# Patient Record
Sex: MALE | Race: WHITE | NOT HISPANIC OR LATINO | URBAN - METROPOLITAN AREA
[De-identification: names, ages, dates, MRNs, and addresses within clinical notes are randomized per-mention and may not be internally consistent; named-entity substitution may affect disease eponyms.]

---

## 2021-09-21 PROCEDURE — U0004 COV-19 TEST NON-CDC HGH THRU: HCPCS | Performed by: FAMILY MEDICINE

## 2022-06-23 ENCOUNTER — OFFICE VISIT (OUTPATIENT)
Dept: ENDOCRINOLOGY | Facility: CLINIC | Age: 67
End: 2022-06-23

## 2022-06-23 VITALS
WEIGHT: 223 LBS | HEART RATE: 80 BPM | OXYGEN SATURATION: 96 % | DIASTOLIC BLOOD PRESSURE: 92 MMHG | BODY MASS INDEX: 35.84 KG/M2 | HEIGHT: 66 IN | SYSTOLIC BLOOD PRESSURE: 154 MMHG

## 2022-06-23 DIAGNOSIS — E11.65 TYPE 2 DIABETES MELLITUS WITH HYPERGLYCEMIA, UNSPECIFIED WHETHER LONG TERM INSULIN USE: Primary | ICD-10-CM

## 2022-06-23 DIAGNOSIS — E78.5 HYPERLIPIDEMIA, UNSPECIFIED HYPERLIPIDEMIA TYPE: ICD-10-CM

## 2022-06-23 LAB
EXPIRATION DATE: ABNORMAL
GLUCOSE BLDC GLUCOMTR-MCNC: 219 MG/DL (ref 70–130)
Lab: ABNORMAL

## 2022-06-23 PROCEDURE — 99204 OFFICE O/P NEW MOD 45 MIN: CPT | Performed by: INTERNAL MEDICINE

## 2022-06-23 PROCEDURE — 82947 ASSAY GLUCOSE BLOOD QUANT: CPT | Performed by: INTERNAL MEDICINE

## 2022-06-23 RX ORDER — TAMSULOSIN HYDROCHLORIDE 0.4 MG/1
1 CAPSULE ORAL DAILY
COMMUNITY
Start: 2022-05-31

## 2022-06-23 RX ORDER — SITAGLIPTIN AND METFORMIN HYDROCHLORIDE 1000; 50 MG/1; MG/1
1 TABLET, FILM COATED ORAL 2 TIMES DAILY
COMMUNITY
Start: 2022-03-14 | End: 2022-07-11

## 2022-06-23 RX ORDER — ATORVASTATIN CALCIUM 20 MG/1
20 TABLET, FILM COATED ORAL DAILY
COMMUNITY
Start: 2022-06-15

## 2022-06-23 RX ORDER — VALSARTAN AND HYDROCHLOROTHIAZIDE 320; 25 MG/1; MG/1
1 TABLET, FILM COATED ORAL DAILY
COMMUNITY
Start: 2022-04-25

## 2022-06-23 RX ORDER — MIRABEGRON 25 MG/1
25 TABLET, FILM COATED, EXTENDED RELEASE ORAL DAILY
COMMUNITY
Start: 2022-05-26

## 2022-06-23 RX ORDER — PEN NEEDLE, DIABETIC 32GX 5/32"
1 NEEDLE, DISPOSABLE MISCELLANEOUS DAILY
COMMUNITY
Start: 2022-04-05

## 2022-06-23 RX ORDER — BLOOD SUGAR DIAGNOSTIC
1 STRIP MISCELLANEOUS 3 TIMES DAILY
COMMUNITY
Start: 2022-04-15

## 2022-06-23 RX ORDER — INSULIN GLARGINE 100 [IU]/ML
35 INJECTION, SOLUTION SUBCUTANEOUS DAILY
COMMUNITY
Start: 2022-06-11 | End: 2022-06-23

## 2022-06-23 RX ORDER — INSULIN GLARGINE 100 [IU]/ML
INJECTION, SOLUTION SUBCUTANEOUS
Qty: 30 ML | Refills: 3 | Status: SHIPPED | OUTPATIENT
Start: 2022-06-23

## 2022-06-23 RX ORDER — GLIPIZIDE 10 MG/1
10 TABLET, FILM COATED, EXTENDED RELEASE ORAL 2 TIMES DAILY
COMMUNITY

## 2022-06-23 RX ORDER — TROSPIUM CHLORIDE 20 MG/1
20 TABLET, FILM COATED ORAL 2 TIMES DAILY
COMMUNITY
Start: 2022-05-09 | End: 2022-11-29

## 2022-06-23 NOTE — PROGRESS NOTES
Chief Complaint   Patient presents with   • Diabetes        New patient who is being seen in consultation regarding diabetes at the request of Akua Joy PA HPI   Andrzej Cheng is a 66 y.o. male who presents for evaluation of diabetes.    Patient has a history of type 2 diabetes.   Patient is currently taking Glipizide extended release 10 mg twice daily, Janumet  1 tablet twice daily, Lantus 35 units once daily.  This regimen was last changed approximately 2 months ago when insulin was started.  Patient also reports that Jardiance was discontinued approximately 2 months ago, he is unsure why this medication was stopped and denies having had any adverse effects with it.  Patient believes that glycemic control is poor.  Patient reports most recent A1c was elevated. Patient reports good adherence to medications.      Patient denies hypoglycemia or symptoms concerning for hypoglycemia  Patient monitors blood glucose rarely, patient reports that he was checking blood sugars values were generally stable in the low 200s and given no medication changes he discontinue routine monitoring.  Patient attempts to follow a diabetic diet.  Patient does report diarrhea on a regular basis as well as some fecal urgency.  He is unsure if the timing of the symptoms correlates to when metformin was started.    Patient denies foot related concerns such as numbness, tingling, or pain.  History of dyslipidemia: Yes  History of renal dysfunction: No  History of Hypertension: Yes      Past Medical History:   Diagnosis Date   • Hyperlipidemia    • Hypertension    • Type 2 diabetes mellitus (HCC)      Past Surgical History:   Procedure Laterality Date   • COLONOSCOPY     • WISDOM TOOTH EXTRACTION        Family History   Problem Relation Age of Onset   • Hypertension Mother    • Diabetes Mother    • Hypertension Father    • Cancer Father    • Esophageal cancer Father    • Hypertension Sister    • Diabetes Sister    • Heart  disease Maternal Grandmother    • Cancer Paternal Grandfather    • Other Son         OVERDOSE   • Drug abuse Son    • Diabetes Son         prediabetes      Social History     Socioeconomic History   • Marital status:    Tobacco Use   • Smoking status: Never Smoker   • Smokeless tobacco: Never Used   Vaping Use   • Vaping Use: Never used   Substance and Sexual Activity   • Alcohol use: Yes     Comment: OCCASIONAL   • Drug use: Never   • Sexual activity: Defer      No Known Allergies   Current Outpatient Medications on File Prior to Visit   Medication Sig Dispense Refill   • atorvastatin (LIPITOR) 20 MG tablet Take 20 mg by mouth Daily.     • glipizide (GLUCOTROL XL) 10 MG 24 hr tablet Take 10 mg by mouth 2 (Two) Times a Day.     • Lantus SoloStar 100 UNIT/ML injection pen Inject 35 Units under the skin into the appropriate area as directed Daily.     • MM Pen Needles 32G X 4 MM misc 1 each by Other route Daily.     • Myrbetriq 25 MG tablet sustained-release 24 hour 24 hr tablet Take 25 mg by mouth Daily.     • OneTouch Ultra test strip 1 each by Other route 3 (Three) Times a Day.     • sitaGLIPtin-metFORMIN (Janumet)  MG per tablet Take 1 tablet by mouth 2 (Two) Times a Day.     • tamsulosin (FLOMAX) 0.4 MG capsule 24 hr capsule Take 1 capsule by mouth Daily.     • trospium (SANCTURA) 20 MG tablet Take 20 mg by mouth 2 (Two) Times a Day.     • valsartan-hydrochlorothiazide (DIOVAN-HCT) 320-25 MG per tablet Take 1 tablet by mouth Daily.       No current facility-administered medications on file prior to visit.        Review of Systems   Constitutional: Positive for activity change. Negative for unexpected weight gain and unexpected weight loss.   HENT: Negative for trouble swallowing and voice change.    Eyes: Negative for pain and visual disturbance.   Respiratory: Negative for cough and shortness of breath.    Cardiovascular: Negative for palpitations and leg swelling.   Gastrointestinal: Positive for  "diarrhea. Negative for constipation.   Endocrine: Negative for polydipsia and polyuria.   Genitourinary: Positive for urgency.   Musculoskeletal: Negative for back pain and neck pain.   Skin: Negative for dry skin and rash.   Neurological: Negative for tremors and headache.   Psychiatric/Behavioral: Negative for sleep disturbance. The patient is not nervous/anxious.         Vitals:    06/23/22 0846   BP: 154/92   BP Location: Right arm   Patient Position: Sitting   Cuff Size: Adult   Pulse: 80   SpO2: 96%   Weight: 101 kg (223 lb)   Height: 167.6 cm (66\")   Body mass index is 35.99 kg/m².     Physical Exam  Vitals reviewed.   Constitutional:       General: He is not in acute distress.     Appearance: Normal appearance. He is obese.   HENT:      Head: Normocephalic and atraumatic.      Right Ear: Hearing normal.      Left Ear: Hearing normal.      Nose: Nose normal.   Eyes:      General: Lids are normal.      Conjunctiva/sclera: Conjunctivae normal.   Neck:      Thyroid: No thyromegaly or thyroid tenderness.   Cardiovascular:      Rate and Rhythm: Normal rate and regular rhythm.      Heart sounds: Murmur heard.   Pulmonary:      Effort: Pulmonary effort is normal.      Breath sounds: Normal breath sounds and air entry.   Abdominal:      General: Bowel sounds are normal.      Palpations: Abdomen is soft.      Tenderness: There is no abdominal tenderness.   Lymphadenopathy:      Head:      Right side of head: No submandibular adenopathy.      Left side of head: No submandibular adenopathy.      Cervical: No cervical adenopathy.   Skin:     General: Skin is warm and dry.      Findings: No rash.   Neurological:      General: No focal deficit present.      Mental Status: He is alert.      Deep Tendon Reflexes: Reflexes are normal and symmetric.   Psychiatric:         Mood and Affect: Mood and affect normal.         Behavior: Behavior is cooperative.          Labs/Imaging    Labs dated 4/1/2022   eGFR 91  Alkaline " phosphatase 63  ALT 17  AST 16    Triglycerides 327  Hemoglobin A1c 10.9     Latest Reference Range & Units 06/23/22 09:08   Glucose 70 - 130 mg/dL 219 !   !: Data is abnormal    Assessment and Plan    Diagnoses and all orders for this visit:    1. Type 2 diabetes mellitus with hyperglycemia, unspecified whether long term insulin use (HCC) (Primary)  -Uncontrolled with most recent hemoglobin A1c 10.9%, too soon to repeat today  -Patient hyperglycemic during office visit, no home glucose data available for review  -Patient reports concerns for diarrhea, he is currently taking Janumet.  Patient given 2 weeks worth of samples of plain Januvia during office visit and will hold Janumet for the next 2 weeks.  Patient will contact the clinic with an update on whether her diarrhea resolved with holding metformin.  -Patient to continue glipizide extended release 20 mg daily  -Patient to increase Lantus to 40 units daily, he will then titrate by 2 units weekly until fasting sugar less than 150, maximum 60  -Patient was advised to monitor blood sugar 2 times daily.  Patient did inquire about CGM use, discussed that insurance coverage is variable.  Freestyle nico sent to pharmacy per patient request.  Patient was instructed to bring glucometer to all future appointments. Patient should contact the clinic between appointments with hypoglycemia or persistent hyperglycemia.  Discussed signs and symptoms of hypoglycemia as well as hypoglycemia management via the rule of 15's.  Discussed potential for long-term complications with uncontrolled diabetes including nephropathy, neuropathy, retinopathy, increased risk for cardiac disease.  Discussed the role of diet and exercise in the management of diabetes.  CMP up-to-date from April 2022, EGFR 91  Lipid panel up-to-date from April 2022,   Obtain urine microalbumin with next labs  -     POC Glucose, Blood    2. Hyperlipidemia, unspecified hyperlipidemia type  LDL  elevated on most recent labs, continue atorvastatin 20 mg daily    Other orders  -     Continuous Blood Gluc Sensor (FreeStyle Eb 2 Sensor) misc; 1 each Every 14 (Fourteen) Days.  Dispense: 3 each; Refill: 11  -     Continuous Blood Gluc  (FreeStyle Eb 2 [place]) device; 1 each 1 (One) Time for 1 dose.  Dispense: 1 each; Refill: 0         Return in about 4 months (around 10/23/2022) for Next scheduled follow up. The patient was instructed to contact the clinic with any interval questions or concerns.    Violeta Rice MD     Dictated Utilizing Dragon Dictation

## 2022-07-11 ENCOUNTER — TELEPHONE (OUTPATIENT)
Dept: ENDOCRINOLOGY | Facility: CLINIC | Age: 67
End: 2022-07-11

## 2022-07-11 NOTE — TELEPHONE ENCOUNTER
PATIENT CALLED TO REQUEST REFILL. PATIENT DOES NOT KNOW WHAT MEDICATION THIS WOULD BE. PATIENT STATES THAT HE WAS GIVEN SAMPLES OF MEDICATION AT LAST OFFICE VISIT AND IS NOW OUT OF THOSE SAMPLES BUT DOES NOT KNOW THE NAME OF SAID SAMPLES.    [facility]

## 2022-11-29 ENCOUNTER — LAB (OUTPATIENT)
Dept: LAB | Facility: HOSPITAL | Age: 67
End: 2022-11-29

## 2022-11-29 ENCOUNTER — OFFICE VISIT (OUTPATIENT)
Dept: ENDOCRINOLOGY | Facility: CLINIC | Age: 67
End: 2022-11-29

## 2022-11-29 VITALS
HEIGHT: 66 IN | SYSTOLIC BLOOD PRESSURE: 148 MMHG | WEIGHT: 221 LBS | HEART RATE: 74 BPM | BODY MASS INDEX: 35.52 KG/M2 | OXYGEN SATURATION: 95 % | DIASTOLIC BLOOD PRESSURE: 90 MMHG

## 2022-11-29 DIAGNOSIS — E11.65 TYPE 2 DIABETES MELLITUS WITH HYPERGLYCEMIA, UNSPECIFIED WHETHER LONG TERM INSULIN USE: Primary | ICD-10-CM

## 2022-11-29 DIAGNOSIS — E78.5 HYPERLIPIDEMIA, UNSPECIFIED HYPERLIPIDEMIA TYPE: ICD-10-CM

## 2022-11-29 LAB
ALBUMIN UR-MCNC: 3.8 MG/DL
CREAT UR-MCNC: 104.1 MG/DL
EXPIRATION DATE: ABNORMAL
GLUCOSE BLDC GLUCOMTR-MCNC: 312 MG/DL (ref 70–130)
HBA1C MFR BLD: 11 % (ref 4.8–5.6)
Lab: ABNORMAL
MICROALBUMIN/CREAT UR: 36.5 MG/G

## 2022-11-29 PROCEDURE — 83036 HEMOGLOBIN GLYCOSYLATED A1C: CPT | Performed by: INTERNAL MEDICINE

## 2022-11-29 PROCEDURE — 82947 ASSAY GLUCOSE BLOOD QUANT: CPT | Performed by: INTERNAL MEDICINE

## 2022-11-29 PROCEDURE — 82043 UR ALBUMIN QUANTITATIVE: CPT | Performed by: INTERNAL MEDICINE

## 2022-11-29 PROCEDURE — 99214 OFFICE O/P EST MOD 30 MIN: CPT | Performed by: INTERNAL MEDICINE

## 2022-11-29 PROCEDURE — 82570 ASSAY OF URINE CREATININE: CPT | Performed by: INTERNAL MEDICINE

## 2022-11-29 NOTE — PROGRESS NOTES
"Chief Complaint   Patient presents with   • Diabetes        HPI   Andrzej Cheng is a 67 y.o. male had concerns including Diabetes.      Patient presents for diabetes follow up. Metformin stopped last visit, reports no current issues with GI upset.  He did increase dose of Lantus to 46 units daily and has been taking this for the past couple of weeks.  He reports fasting sugars generally around 250.  Only single data point noted on glucometer.    Current diabetes medications include the following  Januvia 100 mg daily  Lantus 46 units daily  Glipizide 20 mg extended release daily    Patient continues on atorvastatin 20 mg daily for hyperlipidemia.  He denies myalgias.    The following portions of the patient's history were reviewed and updated as appropriate: allergies, current medications and past social history.    Review of Systems   Gastrointestinal: Negative for diarrhea, nausea and vomiting.   Endocrine: Negative for polydipsia and polyuria.   Musculoskeletal: Negative for myalgias.      /90 (BP Location: Right arm, Patient Position: Sitting, Cuff Size: Adult)   Pulse 74   Ht 167.6 cm (66\")   Wt 100 kg (221 lb)   SpO2 95%   BMI 35.67 kg/m²      Physical Exam      Constitutional:  well developed; well nourished  no acute distress  appears stated age   ENT/Thyroid: not examined   Eyes: Conjunctiva: clear   Respiratory:  breathing is unlabored  clear to auscultation bilaterally   Cardiovascular:  regular rate and rhythm   Chest:  Not performed.   Abdomen: Not performed.   : Not performed.   Musculoskeletal: Not performed   Skin: not performed.   Neuro: mental status, speech normal   Psych: mood and affect are within normal limits       Labs/Imaging   Latest Reference Range & Units 11/29/22 08:40   Glucose 70 - 130 mg/dL 312 !   !: Data is abnormal    Diagnoses and all orders for this visit:    1. Type 2 diabetes mellitus with hyperglycemia, unspecified whether long term insulin use (HCC) " (Primary)  Diabetes uncontrolled with hyperglycemia, check A1c with labs  Patient to continue glipizide extended release 20 mg daily  Plan to start Jardiance 10 mg daily, reviewed potential adverse effects  Plan to increase Lantus to 50 units daily  Continue Januvia 100 mg daily  Patient was advised to monitor blood sugar 2 times daily.  Patient was instructed to bring glucometer to all future appointments. Patient should contact the clinic between appointments with hypoglycemia or persistent hyperglycemia.  Discussed signs and symptoms of hypoglycemia as well as hypoglycemia management via the rule of 15's.  Discussed potential for long-term complications with uncontrolled diabetes including nephropathy, neuropathy, retinopathy, increased risk for cardiac disease.  Discussed the role of diet and exercise in the management of diabetes.  Update urine microalbumin today  -     POC Glucose, Blood  -     Microalbumin / Creatinine Urine Ratio - Urine, Clean Catch  -     Hemoglobin A1c    2. Hyperlipidemia, unspecified hyperlipidemia type  Lipid panel is up-to-date, continue atorvastatin     Addendum dated 1/12/23  Received results of eye exam dated 12/2022, see scanned report  Return in about 3 months (around 2/28/2023) for Next scheduled follow up. The patient was instructed to contact the clinic with any interval questions or concerns.    Violeta Rice MD   Endocrinologist    Dictated Utilizing Dragon Dictation

## 2022-11-30 ENCOUNTER — TELEPHONE (OUTPATIENT)
Dept: ENDOCRINOLOGY | Facility: CLINIC | Age: 67
End: 2022-11-30

## 2023-01-24 RX ORDER — SITAGLIPTIN 100 MG/1
TABLET, FILM COATED ORAL
Qty: 90 TABLET | Refills: 0 | Status: SHIPPED | OUTPATIENT
Start: 2023-01-24

## 2023-02-06 ENCOUNTER — TELEPHONE (OUTPATIENT)
Dept: ENDOCRINOLOGY | Facility: CLINIC | Age: 68
End: 2023-02-06
Payer: MEDICARE

## 2023-02-06 NOTE — TELEPHONE ENCOUNTER
"Called pt and told him per the OV on 11-29-22 \"Plan to start Jardiance 10 mg daily, Continue Januvia 100 mg daily\"    He verbalized understanding.  "

## 2023-02-06 NOTE — TELEPHONE ENCOUNTER
Please call pt he needs to clarify his medications he thinks the pharmacy refilled something wrong    Its regarding jardiance and januvia    pts number  [phone]

## 2023-03-17 RX ORDER — EMPAGLIFLOZIN 10 MG/1
TABLET, FILM COATED ORAL
Qty: 30 TABLET | Refills: 1 | Status: SHIPPED | OUTPATIENT
Start: 2023-03-17

## 2023-04-18 ENCOUNTER — OFFICE VISIT (OUTPATIENT)
Dept: ENDOCRINOLOGY | Facility: CLINIC | Age: 68
End: 2023-04-18
Payer: MEDICARE

## 2023-04-18 VITALS
HEART RATE: 74 BPM | OXYGEN SATURATION: 95 % | BODY MASS INDEX: 37.45 KG/M2 | HEIGHT: 66 IN | DIASTOLIC BLOOD PRESSURE: 78 MMHG | SYSTOLIC BLOOD PRESSURE: 142 MMHG | WEIGHT: 233 LBS

## 2023-04-18 DIAGNOSIS — E11.65 TYPE 2 DIABETES MELLITUS WITH HYPERGLYCEMIA, UNSPECIFIED WHETHER LONG TERM INSULIN USE: Primary | ICD-10-CM

## 2023-04-18 DIAGNOSIS — E78.5 HYPERLIPIDEMIA, UNSPECIFIED HYPERLIPIDEMIA TYPE: ICD-10-CM

## 2023-04-18 LAB
EXPIRATION DATE: ABNORMAL
EXPIRATION DATE: NORMAL
GLUCOSE BLDC GLUCOMTR-MCNC: 302 MG/DL (ref 70–130)
HBA1C MFR BLD: 10.6 %
Lab: ABNORMAL
Lab: NORMAL

## 2023-04-18 PROCEDURE — 1160F RVW MEDS BY RX/DR IN RCRD: CPT | Performed by: INTERNAL MEDICINE

## 2023-04-18 PROCEDURE — 3046F HEMOGLOBIN A1C LEVEL >9.0%: CPT | Performed by: INTERNAL MEDICINE

## 2023-04-18 PROCEDURE — 82947 ASSAY GLUCOSE BLOOD QUANT: CPT | Performed by: INTERNAL MEDICINE

## 2023-04-18 PROCEDURE — 83036 HEMOGLOBIN GLYCOSYLATED A1C: CPT | Performed by: INTERNAL MEDICINE

## 2023-04-18 PROCEDURE — 99214 OFFICE O/P EST MOD 30 MIN: CPT | Performed by: INTERNAL MEDICINE

## 2023-04-18 PROCEDURE — 1159F MED LIST DOCD IN RCRD: CPT | Performed by: INTERNAL MEDICINE

## 2023-04-18 RX ORDER — INSULIN GLARGINE 100 [IU]/ML
INJECTION, SOLUTION SUBCUTANEOUS
Qty: 30 ML | Refills: 3 | Status: SHIPPED | OUTPATIENT
Start: 2023-04-18

## 2023-04-18 NOTE — PROGRESS NOTES
"Chief Complaint   Patient presents with   • Diabetes        HPI   Andrzej Cheng is a 67 y.o. male had concerns including Diabetes.     Patient reports no significant health changes in the interim since last visit.  He reports that diet remains less than ideal and states he is not exercising regularly.  He denies any adverse effects since starting Jardiance.    Current diabetes medications include the following:  Lantus 50 units daily  Jardiance 10 mg daily  Glipizide extended release 20 mg daily  Januvia 100 mg daily    Patient did not tolerate metformin secondary to diarrhea.    Data from glucometer reviewed:  7-day average 249  30-day average 231    Patient continues on atorvastatin 20 mg daily    The following portions of the patient's history were reviewed and updated as appropriate: allergies, current medications and past social history.    Review of Systems   Gastrointestinal: Negative for nausea and vomiting.   Endocrine: Negative for polydipsia and polyuria.   Musculoskeletal: Negative for myalgias.        /78 (BP Location: Right arm, Patient Position: Sitting, Cuff Size: Adult)   Pulse 74   Ht 167.6 cm (66\")   Wt 106 kg (233 lb)   SpO2 95%   BMI 37.61 kg/m²      Physical Exam      Constitutional:  well developed; well nourished  no acute distress   ENT/Thyroid: not examined   Eyes: Conjunctiva: clear   Respiratory:  breathing is unlabored  clear to auscultation bilaterally   Cardiovascular:  regular rate and rhythm   Chest:  Not performed.   Abdomen: Not performed.   : Not performed.   Musculoskeletal: Not performed   Skin: not performed.   Neuro: mental status, speech normal   Psych: mood and affect are within normal limits       Labs/Imaging   Latest Reference Range & Units 04/18/23 08:32   Glucose 70 - 130 mg/dL 302 !   Hemoglobin A1C % 10.6   !: Data is abnormal    Diagnoses and all orders for this visit:    1. Type 2 diabetes mellitus with hyperglycemia, unspecified whether long " term insulin use (Primary)  Diabetes is poorly controlled with hemoglobin A1c 10.6%, minimally improved from prior  Increase Jardiance to 25 mg daily  Increase Lantus to 55 units daily  Continue Januvia 100 mg daily  Continue glipizide extended release 20 mg daily  Discussed potential need to consider transition of DPP 4 to GLP-1 receptor agonist next visit if glycemic control not significantly improved.  Patient was advised to monitor blood sugar 2 times daily.  Patient was instructed to bring glucometer to all future appointments. Patient should contact the clinic between appointments with hypoglycemia or persistent hyperglycemia.  Discussed signs and symptoms of hypoglycemia as well as hypoglycemia management via the rule of 15's.  Discussed potential for long-term complications with uncontrolled diabetes including nephropathy, neuropathy, retinopathy, increased risk for cardiac disease.  Discussed the role of diet and exercise in the management of diabetes.  Eye exam is up-to-date from December 2022  Urine microalbumin up-to-date from November 2022  Lipids, CMP up-to-date from April 2022  -     POC Glucose, Blood  -     POC Glycosylated Hemoglobin (Hb A1C)    2. Hyperlipidemia, unspecified hyperlipidemia type  Lipid panel is up-to-date, continue atorvastatin    Other orders  -     empagliflozin (Jardiance) 25 MG tablet tablet; Take 1 tablet by mouth Daily.  Dispense: 30 tablet; Refill: 5  -     Lantus SoloStar 100 UNIT/ML injection pen; 55 units daily  Dispense: 30 mL; Refill: 3    Return in about 3 months (around 7/18/2023) for Next scheduled follow up. The patient was instructed to contact the clinic with any interval questions or concerns.    Violeta Rice MD   Endocrinologist    Dictated Utilizing Dragon Dictation

## 2023-04-24 RX ORDER — SITAGLIPTIN 100 MG/1
TABLET, FILM COATED ORAL
Qty: 90 TABLET | Refills: 1 | Status: SHIPPED | OUTPATIENT
Start: 2023-04-24

## 2023-04-24 NOTE — TELEPHONE ENCOUNTER
Last office visit with prescribing clinician: 4/18/2023       Next office visit with prescribing clinician: 8/8/2023     {

## 2023-05-26 RX ORDER — EMPAGLIFLOZIN 10 MG/1
TABLET, FILM COATED ORAL
Qty: 30 TABLET | Refills: 0 | OUTPATIENT
Start: 2023-05-26

## 2023-06-02 RX ORDER — EMPAGLIFLOZIN 10 MG/1
TABLET, FILM COATED ORAL
Qty: 30 TABLET | Refills: 0 | OUTPATIENT
Start: 2023-06-02

## 2023-06-02 NOTE — TELEPHONE ENCOUNTER
The original prescription was discontinued on 4/18/2023 by Violeta Rice MD for the following reason: *Therapy completed. Renewing this prescription may not be appropriate.    Changed to 25 mg by YUE

## 2023-08-08 ENCOUNTER — OFFICE VISIT (OUTPATIENT)
Dept: ENDOCRINOLOGY | Facility: CLINIC | Age: 68
End: 2023-08-08
Payer: MEDICARE

## 2023-08-08 VITALS
WEIGHT: 233 LBS | BODY MASS INDEX: 37.45 KG/M2 | HEIGHT: 66 IN | DIASTOLIC BLOOD PRESSURE: 78 MMHG | HEART RATE: 76 BPM | OXYGEN SATURATION: 96 % | SYSTOLIC BLOOD PRESSURE: 124 MMHG

## 2023-08-08 DIAGNOSIS — E11.65 TYPE 2 DIABETES MELLITUS WITH HYPERGLYCEMIA, UNSPECIFIED WHETHER LONG TERM INSULIN USE: Primary | ICD-10-CM

## 2023-08-08 DIAGNOSIS — E78.5 HYPERLIPIDEMIA, UNSPECIFIED HYPERLIPIDEMIA TYPE: ICD-10-CM

## 2023-08-08 LAB
ALBUMIN SERPL-MCNC: 4.7 G/DL (ref 3.5–5.2)
ALBUMIN UR-MCNC: 3.8 MG/DL
ALBUMIN/GLOB SERPL: 1.8 G/DL
ALP SERPL-CCNC: 67 U/L (ref 39–117)
ALT SERPL W P-5'-P-CCNC: 33 U/L (ref 1–41)
ANION GAP SERPL CALCULATED.3IONS-SCNC: 17.2 MMOL/L (ref 5–15)
AST SERPL-CCNC: 24 U/L (ref 1–40)
BILIRUB SERPL-MCNC: 0.5 MG/DL (ref 0–1.2)
BUN SERPL-MCNC: 19 MG/DL (ref 8–23)
BUN/CREAT SERPL: 22.1 (ref 7–25)
CALCIUM SPEC-SCNC: 9.8 MG/DL (ref 8.6–10.5)
CHLORIDE SERPL-SCNC: 100 MMOL/L (ref 98–107)
CHOLEST SERPL-MCNC: 235 MG/DL (ref 0–200)
CO2 SERPL-SCNC: 23.8 MMOL/L (ref 22–29)
CREAT SERPL-MCNC: 0.86 MG/DL (ref 0.76–1.27)
CREAT UR-MCNC: 38.6 MG/DL
EGFRCR SERPLBLD CKD-EPI 2021: 93.7 ML/MIN/1.73
EXPIRATION DATE: ABNORMAL
EXPIRATION DATE: NORMAL
GLOBULIN UR ELPH-MCNC: 2.6 GM/DL
GLUCOSE BLDC GLUCOMTR-MCNC: 211 MG/DL (ref 70–130)
GLUCOSE SERPL-MCNC: 230 MG/DL (ref 65–99)
HBA1C MFR BLD: 9.8 %
HDLC SERPL-MCNC: 43 MG/DL (ref 40–60)
LDLC SERPL CALC-MCNC: 122 MG/DL (ref 0–100)
LDLC/HDLC SERPL: 2.61 {RATIO}
Lab: ABNORMAL
Lab: NORMAL
MICROALBUMIN/CREAT UR: 98.4 MG/G
POTASSIUM SERPL-SCNC: 3.9 MMOL/L (ref 3.5–5.2)
PROT SERPL-MCNC: 7.3 G/DL (ref 6–8.5)
SODIUM SERPL-SCNC: 141 MMOL/L (ref 136–145)
TRIGL SERPL-MCNC: 398 MG/DL (ref 0–150)
VLDLC SERPL-MCNC: 70 MG/DL (ref 5–40)

## 2023-08-08 PROCEDURE — 82570 ASSAY OF URINE CREATININE: CPT | Performed by: INTERNAL MEDICINE

## 2023-08-08 PROCEDURE — 99214 OFFICE O/P EST MOD 30 MIN: CPT | Performed by: INTERNAL MEDICINE

## 2023-08-08 PROCEDURE — 82043 UR ALBUMIN QUANTITATIVE: CPT | Performed by: INTERNAL MEDICINE

## 2023-08-08 PROCEDURE — 80061 LIPID PANEL: CPT | Performed by: INTERNAL MEDICINE

## 2023-08-08 PROCEDURE — 83036 HEMOGLOBIN GLYCOSYLATED A1C: CPT | Performed by: INTERNAL MEDICINE

## 2023-08-08 PROCEDURE — 80053 COMPREHEN METABOLIC PANEL: CPT | Performed by: INTERNAL MEDICINE

## 2023-08-08 PROCEDURE — 82947 ASSAY GLUCOSE BLOOD QUANT: CPT | Performed by: INTERNAL MEDICINE

## 2023-08-08 PROCEDURE — 3046F HEMOGLOBIN A1C LEVEL >9.0%: CPT | Performed by: INTERNAL MEDICINE

## 2023-08-08 RX ORDER — TIRZEPATIDE 2.5 MG/.5ML
2.5 INJECTION, SOLUTION SUBCUTANEOUS WEEKLY
Qty: 2 ML | Refills: 3 | Status: SHIPPED | OUTPATIENT
Start: 2023-08-08

## 2023-08-08 RX ORDER — BLOOD SUGAR DIAGNOSTIC
1 STRIP MISCELLANEOUS 3 TIMES DAILY
Qty: 100 EACH | Refills: 3 | Status: SHIPPED | OUTPATIENT
Start: 2023-08-08

## 2023-08-08 RX ORDER — ATORVASTATIN CALCIUM 40 MG/1
40 TABLET, FILM COATED ORAL DAILY
Qty: 90 TABLET | Refills: 1 | Status: SHIPPED | OUTPATIENT
Start: 2023-08-08 | End: 2024-08-07

## 2023-08-08 NOTE — PROGRESS NOTES
"Chief Complaint   Patient presents with    Diabetes        HPI   Andrzej Cheng is a 67 y.o. male had concerns including Diabetes.      Patient reports no significant health changes in the interim since last visit.  He reports he has not been monitoring glucose very frequently and does usually check fasting when he does monitor.  He denies any current adverse effects of medications.    Current diabetes medications include the following:  Jardiance 25 mg daily  Lantus 55 units daily  Januvia 100 mg daily  Glipizide extended release 20 mg daily    Data from glucometer reviewed, 30-day average 167, 4 data points.    Patient continues on atorvastatin 20 mg daily hyperlipidemia, denies myalgias.    The following portions of the patient's history were reviewed and updated as appropriate: allergies, current medications, and past social history.    Review of Systems   Gastrointestinal:  Negative for nausea and vomiting.   Endocrine: Negative for polydipsia and polyuria.   Musculoskeletal:  Negative for myalgias.      /78 (BP Location: Left arm, Patient Position: Sitting, Cuff Size: Adult)   Pulse 76   Ht 167.6 cm (66\")   Wt 106 kg (233 lb)   SpO2 96%   BMI 37.61 kg/mý      Physical Exam      Constitutional:  well developed; well nourished  no acute distress  appears stated age   ENT/Thyroid: not examined   Eyes: Conjunctiva: clear   Respiratory:  breathing is unlabored  clear to auscultation bilaterally   Cardiovascular:  regular rate and rhythm   Chest:  Not performed.   Abdomen: Not performed.   : Not performed.   Musculoskeletal: Not performed   Skin: not performed.   Neuro: mental status, speech normal   Psych: mood and affect are within normal limits       Labs/Imaging   Latest Reference Range & Units 08/08/23 09:17   Glucose 70 - 130 mg/dL 211 !   Hemoglobin A1C % 9.8   !: Data is abnormal    Diagnoses and all orders for this visit:    1. Type 2 diabetes mellitus with hyperglycemia, unspecified " whether long term insulin use (Primary)  -     POC Glucose, Blood  -     POC Glycosylated Hemoglobin (Hb A1C)  -     Microalbumin / Creatinine Urine Ratio - Urine, Clean Catch; Future  -     Lipid Panel; Future  -     Comprehensive Metabolic Panel; Future  Diabetes is uncontrolled with hemoglobin A1c 9.8%  Patient to increase Lantus to 62 units daily  Stop Januvia and start Mounjaro 2.5 mg weekly formulary equivalent.  Potential adverse effects of this medication class were reviewed including risk of gastrointestinal upset, pancreatitis, data regarding medullary thyroid cancer.  Patient to continue Jardiance 25 mg daily  Patient continue glipizide extended release 20 mg daily  Patient was advised to monitor blood sugar 2 times daily.  Patient was instructed to bring glucometer to all future appointments. Patient should contact the clinic between appointments with hypoglycemia or persistent hyperglycemia.  Discussed signs and symptoms of hypoglycemia as well as hypoglycemia management via the rule of 15's.  Discussed potential for long-term complications with uncontrolled diabetes including nephropathy, neuropathy, retinopathy, increased risk for cardiac disease.  Discussed the role of diet and exercise in the management of diabetes.   Update screening labs today    2. Hyperlipidemia, unspecified hyperlipidemia type  Repeating lipid panel today, continue atorvastatin 20 mg daily    Other orders  -     OneTouch Ultra test strip; 1 each by Other route 3 (Three) Times a Day.  Dispense: 100 each; Refill: 3  -     Tirzepatide (Mounjaro) 2.5 MG/0.5ML solution pen-injector; Inject 0.5 mL under the skin into the appropriate area as directed 1 (One) Time Per Week.  Dispense: 2 mL; Refill: 3      Return in about 4 months (around 12/8/2023). The patient was instructed to contact the clinic with any interval questions or concerns.    Violeta Rice MD   Endocrinologist    Dictated Utilizing Dragon Dictation

## 2023-09-05 ENCOUNTER — TELEPHONE (OUTPATIENT)
Dept: ENDOCRINOLOGY | Facility: CLINIC | Age: 68
End: 2023-09-05
Payer: MEDICARE

## 2023-09-05 NOTE — TELEPHONE ENCOUNTER
CALL BACK [phone]    PATIENT IS REQUESTING TO SPEAK WITH DR HERZOG REGARDING HIS INSULIN. NO FURTHER DETAILS GIVEN DURING THIS CALL.

## 2023-09-05 NOTE — TELEPHONE ENCOUNTER
Spoke with patient and he was confused on what he was suppose to do about his medications. I advised per last visit that she wants him to stop the Januvia and start 2.5 mg of Mounjaro. He voiced understanding and was advised on how to inject the Mounjaro and when. Possible side effects was discussed as well.

## 2023-10-16 RX ORDER — EMPAGLIFLOZIN 25 MG/1
25 TABLET, FILM COATED ORAL DAILY
Qty: 30 TABLET | Refills: 0 | OUTPATIENT
Start: 2023-10-16

## 2023-10-16 NOTE — TELEPHONE ENCOUNTER
Pt has stopped this medication.     Last office visit with prescribing clinician: 8/8/2023       Next office visit with prescribing clinician: Visit date not found

## 2023-12-07 NOTE — TELEPHONE ENCOUNTER
Last office visit with prescribing clinician: 8/8/2023       Next office visit with prescribing clinician: Visit date not found     {

## 2023-12-08 RX ORDER — INSULIN GLARGINE 100 [IU]/ML
INJECTION, SOLUTION SUBCUTANEOUS
Qty: 30 ML | Refills: 0 | Status: SHIPPED | OUTPATIENT
Start: 2023-12-08

## 2023-12-08 RX ORDER — INSULIN GLARGINE 100 [IU]/ML
INJECTION, SOLUTION SUBCUTANEOUS
Qty: 30 ML | Refills: 0 | OUTPATIENT
Start: 2023-12-08

## 2023-12-20 RX ORDER — TIRZEPATIDE 2.5 MG/.5ML
INJECTION, SOLUTION SUBCUTANEOUS
Qty: 2 ML | Refills: 4 | Status: SHIPPED | OUTPATIENT
Start: 2023-12-20

## 2023-12-20 NOTE — TELEPHONE ENCOUNTER
Rx Refill Note    Requested Prescriptions     Pending Prescriptions Disp Refills    Mounjaro 2.5 MG/0.5ML solution pen-injector pen [Pharmacy Med Name: Mounjaro 2.5 MG/0.5ML Subcutaneous Solution Pen-injector] 4 mL 0     Sig: INJECT 2.5MG UNDER THE SKIN INTO THE APPROPRIATE AREA AS DIRECTED ONCE A WEEK        Last office visit with prescribing clinician: 8/8/2023       Next office visit with prescribing clinician: 5/8/2024     {    Kathryn Colorado MA  12/20/23, 11:34 EST

## 2024-01-15 ENCOUNTER — PRIOR AUTHORIZATION (OUTPATIENT)
Dept: ENDOCRINOLOGY | Facility: CLINIC | Age: 69
End: 2024-01-15
Payer: MEDICARE

## 2024-01-31 RX ORDER — INSULIN GLARGINE 100 [IU]/ML
INJECTION, SOLUTION SUBCUTANEOUS
Qty: 30 ML | Refills: 3 | Status: SHIPPED | OUTPATIENT
Start: 2024-01-31

## 2024-01-31 NOTE — TELEPHONE ENCOUNTER
Pt notified and stated he would like a sooner appt due to his numbers being everywhere. He was transferred to the front to be rescheduled for 2-2-24.

## 2024-01-31 NOTE — TELEPHONE ENCOUNTER
Rx Refill Note    Requested Prescriptions     Pending Prescriptions Disp Refills    Lantus SoloStar 100 UNIT/ML injection pen [Pharmacy Med Name: Lantus SoloStar 100 UNIT/ML Subcutaneous Solution Pen-injector] 30 mL 0     Sig: INJECT 55 UNITS SUBCUTANEOUSLY ONCE DAILY        Last office visit with prescribing clinician: 8/8/2023       Next office visit with prescribing clinician: 5/8/2024     {    Kathryn Colorado MA  01/31/24, 10:59 EST

## 2024-01-31 NOTE — TELEPHONE ENCOUNTER
Refill sent to pharmacy.  Of note, patient was last seen in August 2023 and is not scheduled to come back until May.  Glucose was not controlled at the time of last visit.  If he is still having glycemic data above goal, he should contact the clinic to see if we can marked into a sooner appointment via cancellation.

## 2024-02-02 ENCOUNTER — OFFICE VISIT (OUTPATIENT)
Dept: ENDOCRINOLOGY | Facility: CLINIC | Age: 69
End: 2024-02-02
Payer: MEDICARE

## 2024-02-02 ENCOUNTER — PRIOR AUTHORIZATION (OUTPATIENT)
Dept: ENDOCRINOLOGY | Facility: CLINIC | Age: 69
End: 2024-02-02

## 2024-02-02 VITALS
HEART RATE: 70 BPM | DIASTOLIC BLOOD PRESSURE: 72 MMHG | OXYGEN SATURATION: 98 % | BODY MASS INDEX: 37.28 KG/M2 | SYSTOLIC BLOOD PRESSURE: 126 MMHG | WEIGHT: 232 LBS | HEIGHT: 66 IN

## 2024-02-02 DIAGNOSIS — E11.65 TYPE 2 DIABETES MELLITUS WITH HYPERGLYCEMIA, UNSPECIFIED WHETHER LONG TERM INSULIN USE: Primary | ICD-10-CM

## 2024-02-02 DIAGNOSIS — E78.5 HYPERLIPIDEMIA, UNSPECIFIED HYPERLIPIDEMIA TYPE: ICD-10-CM

## 2024-02-02 LAB
EXPIRATION DATE: ABNORMAL
EXPIRATION DATE: ABNORMAL
GLUCOSE BLDC GLUCOMTR-MCNC: 264 MG/DL (ref 70–130)
HBA1C MFR BLD: 10.3 % (ref 4.5–5.7)
Lab: ABNORMAL
Lab: ABNORMAL

## 2024-02-02 RX ORDER — BLOOD-GLUCOSE SENSOR
1 EACH MISCELLANEOUS
Qty: 3 EACH | Refills: 5 | Status: SHIPPED | OUTPATIENT
Start: 2024-02-02

## 2024-02-02 RX ORDER — SEMAGLUTIDE 0.68 MG/ML
0.5 INJECTION, SOLUTION SUBCUTANEOUS WEEKLY
Qty: 3 ML | Refills: 2 | Status: SHIPPED | OUTPATIENT
Start: 2024-02-02

## 2024-02-02 RX ORDER — SEMAGLUTIDE 1.34 MG/ML
0.5 INJECTION, SOLUTION SUBCUTANEOUS WEEKLY
Qty: 1.5 ML | Refills: 2 | Status: SHIPPED | OUTPATIENT
Start: 2024-02-02 | End: 2024-02-02

## 2024-02-02 NOTE — PROGRESS NOTES
"Chief Complaint   Patient presents with    Diabetes        HPI   Andrzej Cheng is a 68 y.o. male had concerns including Diabetes.      Patient reports he is no longer taking Jardiance.  He reports that he placed this \"on hold\".  He did not have any prior adverse reaction to this medication.  He does report that he has been able to start Mounjaro but insurance recently notified him that they will not cover this medication, they did provide alternatives which were reviewed.  Patient also continues on glipizide 10 mg twice daily and Lantus 62 units daily.    Patient is monitoring glucose data only intermittently: 2 values in the last 7 days, average 279.  He reports interest in CGM.    Patient continues on atorvastatin 40 mg daily for hyperlipidemia.    The following portions of the patient's history were reviewed and updated as appropriate: allergies, current medications, and past social history.    Review of Systems   Gastrointestinal:  Negative for nausea and vomiting.     /72 (BP Location: Left arm, Patient Position: Sitting, Cuff Size: Adult)   Pulse 70   Ht 167.6 cm (66\")   Wt 105 kg (232 lb)   SpO2 98%   BMI 37.45 kg/m²      Physical Exam      Constitutional:  well developed; well nourished  no acute distress  appears stated age   ENT/Thyroid: not examined   Eyes: Conjunctiva: clear   Respiratory:  breathing is unlabored  clear to auscultation bilaterally   Cardiovascular:  regular rate and rhythm   Chest:  Not performed.   Abdomen: Not performed.   : Not performed.   Musculoskeletal: Not performed   Skin: not performed.   Neuro: mental status, speech normal   Psych: mood and affect are within normal limits       Labs/Imaging  Glucose:  Lab Results   Component Value Date    POCGLU 264 (A) 02/02/2024     HbA1c:  Lab Results   Component Value Date    HGBA1C 10.3 (A) 02/02/2024    HGBA1C 9.8 08/08/2023     Microalbumin:  Lab Results   Component Value Date    MICROALBUR 3.8 08/08/2023     Lipid " Panel:  Lab Results   Component Value Date    CHOL 235 (H) 08/08/2023    TRIG 398 (H) 08/08/2023    HDL 43 08/08/2023     (H) 08/08/2023    VLDL 70 (H) 08/08/2023    LDLHDL 2.61 08/08/2023     CMP:  Lab Results   Component Value Date    BUN 19 08/08/2023    CREATININE 0.86 08/08/2023    BCR 22.1 08/08/2023     08/08/2023    K 3.9 08/08/2023    CO2 23.8 08/08/2023    CALCIUM 9.8 08/08/2023    ALBUMIN 4.7 08/08/2023    BILITOT 0.5 08/08/2023    ALKPHOS 67 08/08/2023    AST 24 08/08/2023    ALT 33 08/08/2023        Diagnoses and all orders for this visit:    1. Type 2 diabetes mellitus with hyperglycemia, unspecified whether long term insulin use (Primary)  -     POC Glucose, Blood  -     POC Glycosylated Hemoglobin (Hb A1C)  Diabetes is poorly controlled with hemoglobin A1c 10.3%.  This is worsened since last visit and home glucose data shows hyperglycemia.  Suspect worsening is due in part to discontinuation of Jardiance.  It is unclear why patient stopped this medication.  Change Mounjaro to Ozempic 0.5 mg weekly given formulary preference.  Patient was advised that this medication can be increased monthly if he is tolerating without adverse effects and glucose values remain high.  Patient will resume Jardiance 25 mg daily.  Potential adverse effects were reviewed.  Patient will continue glipizide 10 mg twice daily.  Patient will continue Lantus 62 units daily.  Discussed with patient that his current glycemic monitoring is not adequate.  Reviewed recommendations for glucose monitoring.  Freestyle nico was sent to pharmacy per patient request.  Eye exam is up-to-date from November 2023  CMP, lipid panel, urine microalbumin are up-to-date from August 2023    2. Hyperlipidemia, unspecified hyperlipidemia type  Lipid panel is up-to-date from August 2023, patient will continue atorvastatin       Return in about 3 months (around 5/2/2024). The patient was instructed to contact the clinic with any interval  questions or concerns.    Electronically signed by: Violeta Rice MD   Endocrinologist    Dictated Utilizing Dragon Dictation

## 2024-02-02 NOTE — TELEPHONE ENCOUNTER
Received paper from the [facility] stating the Ozempic needs to be changed to 3 ml pens.    Pt seen today  
Rx Refill Note    Requested Prescriptions      No prescriptions requested or ordered in this encounter        Last office visit with prescribing clinician: 2/2/2024       Next office visit with prescribing clinician: 5/8/2024     {    Kathryn Colorado MA  02/02/24, 15:00 EST   
98

## 2024-02-05 NOTE — TELEPHONE ENCOUNTER
Andrzej Cheng (Key: [id number])  Rx #: [id number] Ozempic (0.25 or 0.5 MG/DOSE) 2MG/3ML pen-injectors  Form [employer] Medicare Electronic PA Form (2017 NCPDP)  Created 3 days ago  Sent to Plan 3 days ago  Plan Response 3 days ago  Submit Clinical Questions 3 days ago  Determination Favorable 3 days ago  Message from Plan  PA Case: [id number], Status: Approved, Coverage Starts on: 1/1/2024 12:00:00 AM, Coverage Ends on: 2/1/2025 12:00:00 AM.    Phar notified

## 2024-02-20 RX ORDER — ATORVASTATIN CALCIUM 40 MG/1
40 TABLET, FILM COATED ORAL DAILY
Qty: 90 TABLET | Refills: 0 | Status: SHIPPED | OUTPATIENT
Start: 2024-02-20

## 2024-02-20 NOTE — TELEPHONE ENCOUNTER
Rx Refill Note    Requested Prescriptions     Pending Prescriptions Disp Refills    atorvastatin (LIPITOR) 40 MG tablet [Pharmacy Med Name: Atorvastatin Calcium 40 MG Oral Tablet] 90 tablet 0     Sig: Take 1 tablet by mouth once daily        Last office visit with prescribing clinician: 2/2/2024       Next office visit with prescribing clinician: 5/8/2024     {    Kathryn Colorado MA  02/20/24, 13:44 EST

## 2024-04-22 RX ORDER — SEMAGLUTIDE 0.68 MG/ML
0.5 INJECTION, SOLUTION SUBCUTANEOUS WEEKLY
Qty: 3 ML | Refills: 0 | Status: SHIPPED | OUTPATIENT
Start: 2024-04-22

## 2024-04-22 NOTE — TELEPHONE ENCOUNTER
Last office visit with prescribing clinician: 2/2/2024       Next office visit with prescribing clinician: 5/8/2024     {

## 2024-05-08 ENCOUNTER — OFFICE VISIT (OUTPATIENT)
Dept: ENDOCRINOLOGY | Facility: CLINIC | Age: 69
End: 2024-05-08
Payer: MEDICARE

## 2024-05-08 VITALS
OXYGEN SATURATION: 98 % | BODY MASS INDEX: 35.36 KG/M2 | DIASTOLIC BLOOD PRESSURE: 70 MMHG | HEART RATE: 69 BPM | SYSTOLIC BLOOD PRESSURE: 128 MMHG | WEIGHT: 220 LBS | HEIGHT: 66 IN

## 2024-05-08 DIAGNOSIS — E11.65 TYPE 2 DIABETES MELLITUS WITH HYPERGLYCEMIA, UNSPECIFIED WHETHER LONG TERM INSULIN USE: Primary | ICD-10-CM

## 2024-05-08 DIAGNOSIS — E78.5 HYPERLIPIDEMIA, UNSPECIFIED HYPERLIPIDEMIA TYPE: ICD-10-CM

## 2024-05-08 LAB
EXPIRATION DATE: ABNORMAL
EXPIRATION DATE: ABNORMAL
GLUCOSE BLDC GLUCOMTR-MCNC: 173 MG/DL (ref 70–130)
HBA1C MFR BLD: 6.9 % (ref 4.5–5.7)
Lab: ABNORMAL
Lab: ABNORMAL

## 2024-05-08 RX ORDER — GLIPIZIDE 10 MG/1
10 TABLET, FILM COATED, EXTENDED RELEASE ORAL DAILY
Qty: 90 TABLET | Refills: 1 | Status: SHIPPED | OUTPATIENT
Start: 2024-05-08

## 2024-05-08 RX ORDER — SEMAGLUTIDE 1.34 MG/ML
1 INJECTION, SOLUTION SUBCUTANEOUS WEEKLY
Qty: 9 ML | Refills: 1 | Status: SHIPPED | OUTPATIENT
Start: 2024-05-08

## 2024-05-08 RX ORDER — AMLODIPINE BESYLATE 10 MG/1
1 TABLET ORAL DAILY
COMMUNITY
Start: 2024-03-11

## 2024-05-08 RX ORDER — INSULIN GLARGINE 100 [IU]/ML
INJECTION, SOLUTION SUBCUTANEOUS
Qty: 30 ML | Refills: 1 | Status: SHIPPED | OUTPATIENT
Start: 2024-05-08

## 2024-05-08 RX ORDER — ATORVASTATIN CALCIUM 40 MG/1
40 TABLET, FILM COATED ORAL DAILY
Qty: 90 TABLET | Refills: 1 | Status: SHIPPED | OUTPATIENT
Start: 2024-05-08

## 2024-09-04 ENCOUNTER — TELEPHONE (OUTPATIENT)
Dept: SLEEP MEDICINE | Age: 69
End: 2024-09-04
Payer: MEDICARE

## 2024-09-04 NOTE — TELEPHONE ENCOUNTER
RELAY:    LVM FOR PT TO RETURN CALL IF HE WANTS TO PROCEED WITH SCHEDULING AN INITIAL CONSULT TO DISCUSS INSPIRE.      PATIENT CAN BE SCHEDULED WITH ANY SLEEP PROVIDER/FIRST AVAILABLE.

## 2024-09-09 ENCOUNTER — OFFICE VISIT (OUTPATIENT)
Dept: SLEEP MEDICINE | Age: 69
End: 2024-09-09
Payer: MEDICARE

## 2024-09-09 VITALS
WEIGHT: 217.4 LBS | HEIGHT: 66 IN | BODY MASS INDEX: 34.94 KG/M2 | DIASTOLIC BLOOD PRESSURE: 72 MMHG | TEMPERATURE: 97.4 F | SYSTOLIC BLOOD PRESSURE: 132 MMHG | OXYGEN SATURATION: 97 % | HEART RATE: 71 BPM

## 2024-09-09 DIAGNOSIS — R06.83 SNORING: ICD-10-CM

## 2024-09-09 DIAGNOSIS — E66.01 MORBID (SEVERE) OBESITY DUE TO EXCESS CALORIES: ICD-10-CM

## 2024-09-09 DIAGNOSIS — I10 HYPERTENSION, UNSPECIFIED TYPE: ICD-10-CM

## 2024-09-09 DIAGNOSIS — G47.33 OBSTRUCTIVE SLEEP APNEA, ADULT: Primary | ICD-10-CM

## 2024-09-09 PROCEDURE — 1159F MED LIST DOCD IN RCRD: CPT | Performed by: NURSE PRACTITIONER

## 2024-09-09 PROCEDURE — 1160F RVW MEDS BY RX/DR IN RCRD: CPT | Performed by: NURSE PRACTITIONER

## 2024-09-09 PROCEDURE — 99203 OFFICE O/P NEW LOW 30 MIN: CPT | Performed by: NURSE PRACTITIONER

## 2024-09-09 NOTE — PROGRESS NOTES
Chief Complaint  Follow-up, Sleep Apnea, and Sleeping Problem    Subjective     History of Present Illness:  Andrzej Cheng is a 68 y.o. male with hypertension, hyperlipidemia, and diabetes mellitus.  Patient has a history of AUGUSTO and needs a new machine.  He also receives discussed the inspire device. He had been diagnosed 4-5 years ago. He has been using his device off and on. He has had difficulty with tolerating the device. He sleeps on the couch usually. He has had difficulty with his vision and there are concerns that sleep apnea was affecting his retina.  Review of self-reported questionnaire notes symptoms including snoring, grinding teeth, and frequent nighttime urination.  Patient reports symptoms have been ongoing for 4-5 years.  He typically goes to bed at 10 PM waking at 4-6 a.m. on weekdays, and 7-8 a.m. on weekends.  Patient estimates an average of 6 hours of sleep per night.  He denies use of tobacco, drinks alcohol monthly a month, and has Aproten moat history of recreational drug use.  He drinks 1 cup regular coffee and 1 regular tea daily.    Further details are as follows:    Idalou Scale is (out of 24): Total score: 7     Estimated average amount of sleep per night: 6  Number of times he wakes up at night: 2 times  Difficulty falling back asleep: occasionally  It usually takes 30 minutes or so to go to sleep.  He feels sleepy upon waking up: not usually  Rotating or night shift work: no    Drowsiness/Sleepiness:  He exhibits the following:  falls asleep watching TV  Naps     Snoring/Breathing:  He exhibits the following:  loud snoring and quits breathing at night    Head Injury:  He exhibits the following:  No    Reflux:  He describes the following:  Not now. Use to about 15 years ago.,    Narcolepsy:  He exhibits the following:  none    RLS/PLMs:  He describes the following:  none    Insomnia:  He describes the following:  frequent awakenings    Parasomnia:  He exhibits the  following:  iliana teeth    Weight:       09/09/24  0956   Weight: 98.6 kg (217 lb 6.4 oz)      Weight change in the last year:  loss: 15 lbs    The patient's relevant past medical, surgical, family, and social history reviewed and updated in Epic as appropriate.    Review of Systems   Constitutional: Negative.    HENT: Negative.     Eyes: Negative.    Respiratory: Negative.     Cardiovascular: Negative.    Gastrointestinal: Negative.    Endocrine: Negative.    Genitourinary: Negative.    Musculoskeletal: Negative.    Skin: Negative.    Allergic/Immunologic: Negative.    Neurological: Negative.    Hematological: Negative.    Psychiatric/Behavioral: Negative.     All other systems reviewed and are negative.      PMH:    Past Medical History:   Diagnosis Date    Hyperlipidemia     Hypertension     Type 2 diabetes mellitus      Past Surgical History:   Procedure Laterality Date    COLONOSCOPY      WISDOM TOOTH EXTRACTION         No Known Allergies    MEDS:  Prior to Admission medications    Medication Sig Start Date End Date Taking? Authorizing Provider   amLODIPine (NORVASC) 10 MG tablet Take 1 tablet by mouth Daily. 3/11/24   Jasson Etienne MD   atorvastatin (LIPITOR) 40 MG tablet Take 1 tablet by mouth Daily. 5/8/24   Violeta Rice MD   Continuous Blood Gluc Sensor (FreeStyle Eb 3 Sensor) misc Use 1 each Every 14 (Fourteen) Days. 2/2/24   Violeta Rice MD   empagliflozin (Jardiance) 25 MG tablet tablet Take 1 tablet by mouth Daily. 5/8/24   Violeta Rice MD   glipizide (GLUCOTROL XL) 10 MG 24 hr tablet Take 1 tablet by mouth Daily. 5/8/24   Violeta Rice MD   Lantus SoloStar 100 UNIT/ML injection pen 32 UNITS SUBCUTANEOUSLY ONCE DAILY 5/8/24   Violeta Rice MD   MM Pen Needles 32G X 4 MM misc 1 each by Other route Daily. 4/5/22   Jasson Etienne MD   OneTouch Ultra test strip 1 each by Other route 3 (Three) Times a Day. 8/8/23   Violeta Rice MD   Semaglutide, 1 MG/DOSE, (Ozempic, 1  "MG/DOSE,) 4 MG/3ML solution pen-injector Inject 1 mg under the skin into the appropriate area as directed 1 (One) Time Per Week. 5/8/24   Violeta Rice MD   tamsulosin (FLOMAX) 0.4 MG capsule 24 hr capsule Take 1 capsule by mouth Daily. 5/31/22   ProviderJasson MD   valsartan-hydrochlorothiazide (DIOVAN-HCT) 320-25 MG per tablet Take 1 tablet by mouth Daily. 4/25/22   Provider, MD Jasson       FH:  Family History   Problem Relation Age of Onset    Hypertension Mother     Diabetes Mother     Hypertension Father     Cancer Father     Esophageal cancer Father     Hypertension Sister     Diabetes Sister     Heart disease Maternal Grandmother     Cancer Paternal Grandfather     Other Son         OVERDOSE    Drug abuse Son     Diabetes Son         prediabetes       Objective   Vital Signs:  /72 (BP Location: Left arm, Patient Position: Sitting, Cuff Size: Adult)   Pulse 71   Temp 97.4 °F (36.3 °C) (Temporal)   Ht 167.6 cm (65.98\")   Wt 98.6 kg (217 lb 6.4 oz)   SpO2 97%   BMI 35.11 kg/m²     Patient's (Body mass index is 35.11 kg/m².) indicates that they are obese (BMI >30) with health related conditions that include obstructive sleep apnea, hypertension, coronary heart disease, and diabetes mellitus . Weight is improving with lifestyle modifications. BMI is is above average; BMI management plan is completed. We discussed portion control and increasing exercise.            Physical Exam  Vitals reviewed.   Constitutional:       Appearance: Normal appearance.   HENT:      Head: Normocephalic and atraumatic.      Nose: Nose normal.      Mouth/Throat:      Mouth: Mucous membranes are moist.   Cardiovascular:      Rate and Rhythm: Normal rate and regular rhythm.      Heart sounds: No murmur heard.     No friction rub. No gallop.   Pulmonary:      Effort: Pulmonary effort is normal. No respiratory distress.      Breath sounds: Normal breath sounds. No wheezing or rhonchi.   Neurological:      Mental " Status: He is alert and oriented to person, place, and time.   Psychiatric:         Behavior: Behavior normal.       Mallampati Score: IV (only hard palate visible)    Result Review :              Assessment and Plan  Andrzej Cheng is a 68 y.o. male with hypertension, hyperlipidemia, and diabetes mellitus who presents to establish care for obstructive sleep apnea.  The patient states he likely had a sleep study approximately 4-5 years ago but does not remember.  He reports that at the time he was told that he has severe sleep apnea at that time.  He has used his PAP off-and-on over the years but has not able to tolerate it.  He also notes that it is quite inconvenient for him.  Of further concern is the patient's vision problems.  He was recently told that he is having difficulty with his retina which may be in part related to sleep apnea.  I have discussed alternatives to PAP therapy and the patient is interested in the inspire device.  At this point, I have recommended a home sleep test to reestablish diagnosis.  I will also refer him to otolaryngology for surgical consult.  Patient has lost approximately 15 pounds primarily with diet.  He continues to work on his weight.  In this instance it may be of help with regard to the severity of sleep apnea.    Diagnoses and all orders for this visit:    1. Obstructive sleep apnea, adult (Primary)  -     Home Sleep Study; Future  -     Ambulatory Referral to ENT (Otolaryngology)    2. Snoring  -     Home Sleep Study; Future    3. Hypertension, unspecified type  -     Home Sleep Study; Future    4. Morbid (severe) obesity due to excess calories                 I discussed the consequences of uncontrolled sleep apnea including hypertension, heart disease, diabetes, stroke, and dementia. I further discussed sleep apnea therapeutic options including CPAP, Weight loss, Oral dental appliance, and surgery.         Follow Up  Return for Follow up after study.  Patient was  given instructions and counseling regarding his condition or for health maintenance advice. Please see specific information pulled into the AVS if appropriate.     JOSUE Zaman, ACNP-BC  Pulmonology, Critical Care, and Sleep Medicine

## 2024-09-13 ENCOUNTER — PATIENT ROUNDING (BHMG ONLY) (OUTPATIENT)
Dept: SLEEP MEDICINE | Age: 69
End: 2024-09-13
Payer: MEDICARE

## 2024-09-13 NOTE — PROGRESS NOTES
September 13, 2024    Hello, may I speak with Andrzej Cheng?    My name is Funmilayo      I am  with [facility]  [address] [address] [phone].    Before we get started may I verify your date of birth? 1955    I am calling to officially welcome you to our practice and ask about your recent visit. Is this a good time to talk? yes    Tell me about your visit with us. What things went well?  The visit went really good no problems,       We're always looking for ways to make our patients' experiences even better. Do you have recommendations on ways we may improve?  no    Overall were you satisfied with your first visit to our practice? yes       I appreciate you taking the time to speak with me today. Is there anything else I can do for you? no      Thank you, and have a great day.

## 2024-10-14 RX ORDER — BLOOD-GLUCOSE SENSOR
EACH MISCELLANEOUS
Qty: 2 EACH | Refills: 1 | Status: SHIPPED | OUTPATIENT
Start: 2024-10-14

## 2024-10-14 NOTE — TELEPHONE ENCOUNTER
Rx Refill Note  Requested Prescriptions     Pending Prescriptions Disp Refills    Continuous Glucose Sensor (FreeStyle Eb 3 Sensor) misc [Pharmacy Med Name: FREESTYLE EB 3 SENSOR KIT] 3 each 0     Sig: CHANGE 1 SENSOR EVERY 14 DAYS      Last office visit with prescribing clinician: 5/8/2024     Next office visit with prescribing clinician: 11/13/2024

## 2024-11-13 ENCOUNTER — OFFICE VISIT (OUTPATIENT)
Dept: ENDOCRINOLOGY | Facility: CLINIC | Age: 69
End: 2024-11-13
Payer: MEDICARE

## 2024-11-13 VITALS
HEIGHT: 66 IN | BODY MASS INDEX: 34.87 KG/M2 | WEIGHT: 217 LBS | SYSTOLIC BLOOD PRESSURE: 140 MMHG | HEART RATE: 76 BPM | DIASTOLIC BLOOD PRESSURE: 78 MMHG

## 2024-11-13 DIAGNOSIS — E78.5 HYPERLIPIDEMIA, UNSPECIFIED HYPERLIPIDEMIA TYPE: ICD-10-CM

## 2024-11-13 DIAGNOSIS — E11.65 TYPE 2 DIABETES MELLITUS WITH HYPERGLYCEMIA, UNSPECIFIED WHETHER LONG TERM INSULIN USE: Primary | ICD-10-CM

## 2024-11-13 LAB
ALBUMIN SERPL-MCNC: 4.4 G/DL (ref 3.5–5.2)
ALBUMIN UR-MCNC: 2.3 MG/DL
ALBUMIN/GLOB SERPL: 1.7 G/DL
ALP SERPL-CCNC: 61 U/L (ref 39–117)
ALT SERPL W P-5'-P-CCNC: 23 U/L (ref 1–41)
ANION GAP SERPL CALCULATED.3IONS-SCNC: 15.4 MMOL/L (ref 5–15)
AST SERPL-CCNC: 16 U/L (ref 1–40)
BILIRUB SERPL-MCNC: 0.5 MG/DL (ref 0–1.2)
BUN SERPL-MCNC: 15 MG/DL (ref 8–23)
BUN/CREAT SERPL: 19.7 (ref 7–25)
CALCIUM SPEC-SCNC: 9.7 MG/DL (ref 8.6–10.5)
CHLORIDE SERPL-SCNC: 100 MMOL/L (ref 98–107)
CHOLEST SERPL-MCNC: 209 MG/DL (ref 0–200)
CO2 SERPL-SCNC: 25.6 MMOL/L (ref 22–29)
CREAT SERPL-MCNC: 0.76 MG/DL (ref 0.76–1.27)
CREAT UR-MCNC: 55.5 MG/DL
EGFRCR SERPLBLD CKD-EPI 2021: 96.1 ML/MIN/1.73
EXPIRATION DATE: ABNORMAL
EXPIRATION DATE: ABNORMAL
GLOBULIN UR ELPH-MCNC: 2.6 GM/DL
GLUCOSE BLDC GLUCOMTR-MCNC: 182 MG/DL (ref 70–130)
GLUCOSE SERPL-MCNC: 178 MG/DL (ref 65–99)
HBA1C MFR BLD: 8.4 % (ref 4.5–5.7)
HDLC SERPL-MCNC: 42 MG/DL (ref 40–60)
LDLC SERPL CALC-MCNC: 128 MG/DL (ref 0–100)
LDLC/HDLC SERPL: 2.94 {RATIO}
Lab: ABNORMAL
Lab: ABNORMAL
MICROALBUMIN/CREAT UR: 41.4 MG/G (ref 0–29)
POTASSIUM SERPL-SCNC: 4.1 MMOL/L (ref 3.5–5.2)
PROT SERPL-MCNC: 7 G/DL (ref 6–8.5)
SODIUM SERPL-SCNC: 141 MMOL/L (ref 136–145)
TRIGL SERPL-MCNC: 218 MG/DL (ref 0–150)
VLDLC SERPL-MCNC: 39 MG/DL (ref 5–40)

## 2024-11-13 PROCEDURE — 80061 LIPID PANEL: CPT | Performed by: INTERNAL MEDICINE

## 2024-11-13 PROCEDURE — 82043 UR ALBUMIN QUANTITATIVE: CPT | Performed by: INTERNAL MEDICINE

## 2024-11-13 PROCEDURE — 82570 ASSAY OF URINE CREATININE: CPT | Performed by: INTERNAL MEDICINE

## 2024-11-13 PROCEDURE — 80053 COMPREHEN METABOLIC PANEL: CPT | Performed by: INTERNAL MEDICINE

## 2024-11-13 RX ORDER — ATORVASTATIN CALCIUM 40 MG/1
40 TABLET, FILM COATED ORAL DAILY
Qty: 90 TABLET | Refills: 1 | Status: SHIPPED | OUTPATIENT
Start: 2024-11-13 | End: 2024-11-15

## 2024-11-13 RX ORDER — SEMAGLUTIDE 2.68 MG/ML
2 INJECTION, SOLUTION SUBCUTANEOUS WEEKLY
Qty: 9 ML | Refills: 1 | Status: SHIPPED | OUTPATIENT
Start: 2024-11-13

## 2024-11-13 RX ORDER — BLOOD-GLUCOSE SENSOR
EACH MISCELLANEOUS
Qty: 2 EACH | Refills: 5 | Status: SHIPPED | OUTPATIENT
Start: 2024-11-13

## 2024-11-13 RX ORDER — GLIPIZIDE 10 MG/1
10 TABLET, FILM COATED, EXTENDED RELEASE ORAL DAILY
Qty: 90 TABLET | Refills: 1 | Status: SHIPPED | OUTPATIENT
Start: 2024-11-13

## 2024-11-13 RX ORDER — INSULIN GLARGINE 100 [IU]/ML
INJECTION, SOLUTION SUBCUTANEOUS
Qty: 30 ML | Refills: 1 | Status: SHIPPED | OUTPATIENT
Start: 2024-11-13

## 2024-11-13 NOTE — PROGRESS NOTES
"Chief Complaint   Patient presents with    Diabetes        HPI   Andrzej Cheng is a 69 y.o. male had concerns including Diabetes.      Patient reports he has not been following an appropriate diet since last visit.  He reports that glucose values are likely elevated due to intake of peanut M&Ms amongst other dietary indiscretions.  He does intend to resume prior diet.    Current diabetes medications include the following:  glipizide extended release 10 mg daily  Jardiance 25 mg daily  Ozempic 1 mg weekly  Lantus 32 units daily    Patient is monitoring glucose via freestyle nico.    Patient  continues on atovastatin for hyperlipidemia.    The following portions of the patient's history were reviewed and updated as appropriate: allergies, current medications, and past social history.    Review of Systems   Gastrointestinal:  Negative for nausea and vomiting.          /78   Pulse 76   Ht 167.6 cm (65.98\")   Wt 98.4 kg (217 lb)   BMI 35.04 kg/m²      Physical Exam      Constitutional:  well developed; well nourished  no acute distress  appears stated age   ENT/Thyroid: not examined   Eyes: Conjunctiva: clear   Respiratory:  breathing is unlabored  clear to auscultation bilaterally   Cardiovascular:  regular rate and rhythm   Chest:  Not performed.   Abdomen: Not performed.   : Not performed.   Musculoskeletal: Not performed   Skin: not performed.   Neuro: mental status, speech normal   Psych: mood and affect are within normal limits       Labs/Imaging  A1C:  Lab Results   Component Value Date    HGBA1C 8.4 (A) 11/13/2024    HGBA1C 6.9 (A) 05/08/2024      Glucose:  Lab Results   Component Value Date    POCGLU 182 (A) 11/13/2024      CMP:  Lab Results   Component Value Date    GLUCOSE 230 (H) 08/08/2023    BUN 19 08/08/2023    CREATININE 0.86 08/08/2023     08/08/2023    K 3.9 08/08/2023     08/08/2023    CALCIUM 9.8 08/08/2023    PROTEINTOT 7.3 08/08/2023    ALBUMIN 4.7 08/08/2023    ALT " 33 08/08/2023    AST 24 08/08/2023    ALKPHOS 67 08/08/2023    BILITOT 0.5 08/08/2023    GLOB 2.6 08/08/2023    AGRATIO 1.8 08/08/2023    BCR 22.1 08/08/2023    ANIONGAP 17.2 (H) 08/08/2023    EGFR 93.7 08/08/2023      Lipid Panel:  Lab Results   Component Value Date    CHOL 235 (H) 08/08/2023    TRIG 398 (H) 08/08/2023    HDL 43 08/08/2023     (H) 08/08/2023      Urine Microalbumin:  Lab Results   Component Value Date    MICROALBUR 3.8 08/08/2023      Freestyle nico downloaded for review for dates ranging October 31 through November 13, 2024, Sensor is active 94% of the time with average glucose 208, GMI 8.3% with 24.4% glucose variability.  Patient has baseline hyperglycemia which worsens postprandially.  There is no pattern of hypoglycemia.  33% of data is within target range, 49% high, 18% very high    Diagnoses and all orders for this visit:    1. Type 2 diabetes mellitus with hyperglycemia, unspecified whether long term insulin use (Primary)  -     POC Glucose, Blood  -     POC Glycosylated Hemoglobin (Hb A1C)  -     Lipid Panel; Future  -     Comprehensive Metabolic Panel; Future  -     Microalbumin / Creatinine Urine Ratio - Urine, Clean Catch; Future  Diabetes is poorly controlled with hemoglobin A1c 8.4%, worsened in comparison to prior.  CGM containing 72 hours of glucose data was downloaded for review  Patient reports worsening A1c likely due to dietary indiscretions.  He is working to improve this.  Discussed increasing Ozempic to 2 mg weekly, patient is agreeable.  Patient continue glipizide extended release 10 mg daily, Jardiance 25 mg daily, Lantus 32 units daily  Goals for glycemic control were reviewed, patient is currently at elevated risk of complications due to suboptimal glycemic control  Monofilament exam was completed May 2024  Eye exam completed within the past year per patient  Update screening labs today    2. Hyperlipidemia, unspecified hyperlipidemia type  Updating lipid panel  today, patient will continue atorvastatin    Other orders  -     Semaglutide, 2 MG/DOSE, (Ozempic, 2 MG/DOSE,) 8 MG/3ML solution pen-injector; Inject 2 mg under the skin into the appropriate area as directed 1 (One) Time Per Week.  Dispense: 9 mL; Refill: 1  -     atorvastatin (LIPITOR) 40 MG tablet; Take 1 tablet by mouth Daily.  Dispense: 90 tablet; Refill: 1  -     Lantus SoloStar 100 UNIT/ML injection pen; 32 UNITS SUBCUTANEOUSLY ONCE DAILY  Dispense: 30 mL; Refill: 1  -     Continuous Glucose Sensor (FreeStyle Eb 3 Plus Sensor); Change sensor every 15 days  Dispense: 2 each; Refill: 5  -     glipizide (GLUCOTROL XL) 10 MG 24 hr tablet; Take 1 tablet by mouth Daily.  Dispense: 90 tablet; Refill: 1  -     empagliflozin (Jardiance) 25 MG tablet tablet; Take 1 tablet by mouth Daily.  Dispense: 90 tablet; Refill: 1         Return in about 4 months (around 3/13/2025) for Next scheduled follow up. The patient was instructed to contact the clinic with any interval questions or concerns.    Electronically signed by: Violeta Rice MD   Endocrinologist    Dictated Utilizing Dragon Dictation

## 2024-11-15 RX ORDER — ATORVASTATIN CALCIUM 80 MG/1
80 TABLET, FILM COATED ORAL DAILY
Qty: 90 TABLET | Refills: 1 | Status: SHIPPED | OUTPATIENT
Start: 2024-11-15

## 2024-12-18 ENCOUNTER — HOSPITAL ENCOUNTER (OUTPATIENT)
Dept: SLEEP MEDICINE | Facility: HOSPITAL | Age: 69
Discharge: HOME | End: 2024-12-18
Admitting: NURSE PRACTITIONER
Payer: MEDICARE

## 2024-12-18 VITALS — BODY MASS INDEX: 34.87 KG/M2 | HEIGHT: 66 IN | WEIGHT: 217 LBS

## 2024-12-18 DIAGNOSIS — R06.83 SNORING: ICD-10-CM

## 2024-12-18 DIAGNOSIS — I10 HYPERTENSION, UNSPECIFIED TYPE: ICD-10-CM

## 2024-12-18 DIAGNOSIS — G47.33 OBSTRUCTIVE SLEEP APNEA, ADULT: ICD-10-CM

## 2024-12-18 PROCEDURE — 95800 SLP STDY UNATTENDED: CPT

## 2024-12-19 DIAGNOSIS — G47.33 OSA (OBSTRUCTIVE SLEEP APNEA): Primary | ICD-10-CM

## 2024-12-30 ENCOUNTER — TELEPHONE (OUTPATIENT)
Dept: SLEEP MEDICINE | Facility: CLINIC | Age: 69
End: 2024-12-30
Payer: MEDICARE

## 2024-12-30 NOTE — TELEPHONE ENCOUNTER
Spoke with patient over the phone regarding sleep study results, patient stated understanding does not want to try pap therapy again. Patient wants to get inspire, will need referral placed for ENT 12/30/24 AB

## 2024-12-31 DIAGNOSIS — G47.33 OSA (OBSTRUCTIVE SLEEP APNEA): Primary | ICD-10-CM

## 2025-01-20 ENCOUNTER — PRIOR AUTHORIZATION (OUTPATIENT)
Dept: ENDOCRINOLOGY | Facility: CLINIC | Age: 70
End: 2025-01-20
Payer: MEDICARE

## 2025-01-20 NOTE — TELEPHONE ENCOUNTER
PA started on CMM for Ozempic (0.25 or 0.5 MG/DOSE) 2MG/3ML pen-injectors    Information regarding your request  Available without authorization. The member is able to fill the requested drug at the pharmacy. If coverage is still needed or requesting prior to the expiration of a current authorization, a request can be made by sending a fax or calling the number on the back of the member's ID card.

## 2025-03-25 ENCOUNTER — TELEPHONE (OUTPATIENT)
Dept: SLEEP MEDICINE | Age: 70
End: 2025-03-25
Payer: MEDICARE

## 2025-03-25 DIAGNOSIS — G47.33 OSA (OBSTRUCTIVE SLEEP APNEA): Primary | ICD-10-CM

## 2025-03-25 DIAGNOSIS — I10 HYPERTENSION, UNSPECIFIED TYPE: ICD-10-CM

## 2025-03-25 NOTE — TELEPHONE ENCOUNTER
Dr Gallego at [facility] is requesting a repeat HST.  The prev test in November did not calculate central apnea which is required for Inspire processing.    [facility] the [facility] states that beginning in December '24 all testing thru [facility] have been configured to calculate central apena.     Please advise when order is in epic.  Thank you.

## 2025-03-26 ENCOUNTER — HOSPITAL ENCOUNTER (OUTPATIENT)
Dept: SLEEP MEDICINE | Facility: HOSPITAL | Age: 70
Discharge: HOME | End: 2025-03-26
Admitting: NURSE PRACTITIONER
Payer: MEDICARE

## 2025-03-26 VITALS — BODY MASS INDEX: 34.87 KG/M2 | WEIGHT: 217 LBS | HEIGHT: 66 IN

## 2025-03-26 DIAGNOSIS — I10 HYPERTENSION, UNSPECIFIED TYPE: ICD-10-CM

## 2025-03-26 DIAGNOSIS — G47.33 OSA (OBSTRUCTIVE SLEEP APNEA): ICD-10-CM

## 2025-03-26 PROCEDURE — 95800 SLP STDY UNATTENDED: CPT

## 2025-03-31 ENCOUNTER — TELEPHONE (OUTPATIENT)
Dept: SLEEP MEDICINE | Age: 70
End: 2025-03-31
Payer: MEDICARE

## 2025-03-31 NOTE — TELEPHONE ENCOUNTER
Called patient regarding his sleep study results, he stated he was doing the sleep study test to see if he would qualify for the inspire. Will send to information to provider.

## 2025-03-31 NOTE — TELEPHONE ENCOUNTER
CALLED PATIENT TO LET HIM KNOW THAT THE RESULTS FROM THE REPEAT HST ALONG WITH THE SIGNED INTERPRETATION HAS BEEN FAXED TO [facility] - DR FUENTES FOR REVIEW.    I SPOKE WITH DAVIE IN DR FUENTES'S OFFICE, SHE WILL MAKE SURE PATIENT IS CONTACTED FOR FOLLOW UP IF NEEDED TO PROCEED WITH INSPIRE.       Adequate: hears normal conversation without difficulty

## 2025-04-02 ENCOUNTER — OFFICE VISIT (OUTPATIENT)
Dept: ENDOCRINOLOGY | Facility: CLINIC | Age: 70
End: 2025-04-02
Payer: MEDICARE

## 2025-04-02 VITALS
BODY MASS INDEX: 35.36 KG/M2 | WEIGHT: 220 LBS | HEIGHT: 66 IN | DIASTOLIC BLOOD PRESSURE: 80 MMHG | SYSTOLIC BLOOD PRESSURE: 150 MMHG | HEART RATE: 77 BPM

## 2025-04-02 DIAGNOSIS — Z79.4 TYPE 2 DIABETES MELLITUS WITH HYPERGLYCEMIA, WITH LONG-TERM CURRENT USE OF INSULIN: Primary | ICD-10-CM

## 2025-04-02 DIAGNOSIS — E78.5 HYPERLIPIDEMIA, UNSPECIFIED HYPERLIPIDEMIA TYPE: ICD-10-CM

## 2025-04-02 DIAGNOSIS — E11.65 TYPE 2 DIABETES MELLITUS WITH HYPERGLYCEMIA, WITH LONG-TERM CURRENT USE OF INSULIN: Primary | ICD-10-CM

## 2025-04-02 LAB
EXPIRATION DATE: ABNORMAL
EXPIRATION DATE: ABNORMAL
GLUCOSE BLDC GLUCOMTR-MCNC: 191 MG/DL (ref 70–130)
HBA1C MFR BLD: 9.7 % (ref 4.5–5.7)
Lab: ABNORMAL
Lab: ABNORMAL

## 2025-04-02 RX ORDER — INSULIN GLARGINE 100 [IU]/ML
INJECTION, SOLUTION SUBCUTANEOUS
Qty: 30 ML | Refills: 1 | Status: SHIPPED | OUTPATIENT
Start: 2025-04-02

## 2025-04-02 RX ORDER — SEMAGLUTIDE 2.68 MG/ML
2 INJECTION, SOLUTION SUBCUTANEOUS WEEKLY
Qty: 9 ML | Refills: 1 | Status: SHIPPED | OUTPATIENT
Start: 2025-04-02

## 2025-04-02 RX ORDER — ATORVASTATIN CALCIUM 80 MG/1
80 TABLET, FILM COATED ORAL DAILY
Qty: 90 TABLET | Refills: 1 | Status: SHIPPED | OUTPATIENT
Start: 2025-04-02

## 2025-04-02 RX ORDER — GLIPIZIDE 10 MG/1
10 TABLET, FILM COATED, EXTENDED RELEASE ORAL DAILY
Qty: 90 TABLET | Refills: 1 | Status: SHIPPED | OUTPATIENT
Start: 2025-04-02

## 2025-04-02 RX ORDER — INSULIN LISPRO 100 [IU]/ML
INJECTION, SOLUTION INTRAVENOUS; SUBCUTANEOUS
Qty: 15 ML | Refills: 1 | Status: SHIPPED | OUTPATIENT
Start: 2025-04-02

## 2025-04-02 NOTE — PROGRESS NOTES
"Chief Complaint   Patient presents with    Diabetes        HPI   Andrzej Cheng is a 69 y.o. male had concerns including Diabetes.      Patient presents for follow-up of diabetes.  He was last seen in November 2024.  He reports ongoing hyperglycemia.  He reports that he knows this is all diet related.  He continues with dietary indiscretions including sweets such as M&Ms and carbohydrates.    Current diabetes medications include the following:  Ozempic 2 mg weekly  Lantus 32 units daily  Glipizide extended release 10 mg daily  Jardiance 25 mg daily    Glucose monitoring was completed via freestyle nico.    Patient is taking atorvastatin 80 mg daily for hyperlipidemia.      The following portions of the patient's history were reviewed and updated as appropriate: allergies, current medications, and past social history.    Review of Systems   ROS was reviewed and verified. All other ROS negative.    /80   Pulse 77   Ht 167.6 cm (65.98\")   Wt 99.8 kg (220 lb)   BMI 35.53 kg/m²      Physical Exam      Constitutional:  well developed; well nourished  no acute distress  appears stated age  obese - Body mass index is 35.53 kg/m².   ENT/Thyroid: not examined   Eyes: Conjunctiva: clear   Respiratory:  breathing is unlabored  clear to auscultation bilaterally   Cardiovascular:  regular rate and rhythm   Chest:  Not performed.   Abdomen: Not performed.   : Not performed.   Musculoskeletal: Not performed   Skin: not performed.   Neuro: mental status, speech normal   Psych: mood and affect are within normal limits       Labs/Imaging  A1C:  Lab Results   Component Value Date    HGBA1C 9.7 (A) 04/02/2025    HGBA1C 8.4 (A) 11/13/2024      Glucose:  Lab Results   Component Value Date    POCGLU 191 (A) 04/02/2025      CMP:  Lab Results   Component Value Date    GLUCOSE 178 (H) 11/13/2024    BUN 15 11/13/2024    CREATININE 0.76 11/13/2024     11/13/2024    K 4.1 11/13/2024     11/13/2024    CALCIUM 9.7 " 11/13/2024    PROTEINTOT 7.0 11/13/2024    ALBUMIN 4.4 11/13/2024    ALT 23 11/13/2024    AST 16 11/13/2024    ALKPHOS 61 11/13/2024    BILITOT 0.5 11/13/2024    GLOB 2.6 11/13/2024    AGRATIO 1.7 11/13/2024    BCR 19.7 11/13/2024    ANIONGAP 15.4 (H) 11/13/2024    EGFR 96.1 11/13/2024      Lipid Panel:  Lab Results   Component Value Date    CHOL 209 (H) 11/13/2024    TRIG 218 (H) 11/13/2024    HDL 42 11/13/2024     (H) 11/13/2024      Urine Microalbumin:  Lab Results   Component Value Date    MICROALBUR 2.3 11/13/2024          Diagnoses and all orders for this visit:    1. Type 2 diabetes mellitus with hyperglycemia, with long-term current use of insulin (Primary)  -     POC Glucose, Blood  -     POC Glycosylated Hemoglobin (Hb A1C)  Diabetes is poorly controlled with hemoglobin A1c 9.7%, this is worsened in comparison to prior  Patient is hyperglycemic during office visit  CGM containing 72 hours of glucose data was downloaded and reviewed  Discussed options for changes to medical therapy.  Patient reports that he is aware that majority of his issue is related to diet.  He was hesitant to make medication changes including adjustments to insulin due to fear for weight gain but ultimately agreed to add correctional insulin and is adamant he will change his diet.  Start correctional Humalog 2 units for 50 greater than 150 before meals  Continue Lantus 32 units daily  Continue glipizide extended release 10 mg daily, Ozempic 2 mg weekly and Jardiance 25 mg daily  Discussed goals for glycemic control.  Patient is currently at elevated risk for complications due to inadequate glycemic control.  Screening labs are up-to-date from November 2024    2. Hyperlipidemia, unspecified hyperlipidemia type  Lipid panel is up-to-date, most recent LDL slightly elevated, patient reports adherence to atorvastatin.  Continue current therapy, when labs are next due will complete when fasting and assess if add-on therapy is  required.    Other orders  -     Insulin Lispro, 1 Unit Dial, (HumaLOG KwikPen) 100 UNIT/ML solution pen-injector; For use per correctional scale, MDD 50 units  Dispense: 15 mL; Refill: 1  -     Insulin Pen Needle 32G X 4 MM misc; For use with insulin injections, up to 4 times daily  Dispense: 200 each; Refill: 1  -     Semaglutide, 2 MG/DOSE, (Ozempic, 2 MG/DOSE,) 8 MG/3ML solution pen-injector; Inject 2 mg under the skin into the appropriate area as directed 1 (One) Time Per Week.  Dispense: 9 mL; Refill: 1  -     empagliflozin (Jardiance) 25 MG tablet tablet; Take 1 tablet by mouth Daily.  Dispense: 90 tablet; Refill: 1  -     glipizide (GLUCOTROL XL) 10 MG 24 hr tablet; Take 1 tablet by mouth Daily.  Dispense: 90 tablet; Refill: 1  -     Lantus SoloStar 100 UNIT/ML injection pen; 32 UNITS SUBCUTANEOUSLY ONCE DAILY  Dispense: 30 mL; Refill: 1  -     atorvastatin (LIPITOR) 80 MG tablet; Take 1 tablet by mouth Daily.  Dispense: 90 tablet; Refill: 1         Return in about 6 weeks (around 5/14/2025) for Next scheduled follow up. The patient was instructed to contact the clinic with any interval questions or concerns.    Electronically signed by: Violeta Rice MD   Endocrinologist    Dictated Utilizing Dragon Dictation

## 2025-05-09 ENCOUNTER — OFFICE VISIT (OUTPATIENT)
Dept: ENDOCRINOLOGY | Facility: CLINIC | Age: 70
End: 2025-05-09
Payer: MEDICARE

## 2025-05-09 VITALS
BODY MASS INDEX: 35.03 KG/M2 | HEART RATE: 75 BPM | HEIGHT: 66 IN | WEIGHT: 218 LBS | DIASTOLIC BLOOD PRESSURE: 70 MMHG | SYSTOLIC BLOOD PRESSURE: 130 MMHG

## 2025-05-09 DIAGNOSIS — E11.65 TYPE 2 DIABETES MELLITUS WITH HYPERGLYCEMIA, WITH LONG-TERM CURRENT USE OF INSULIN: Primary | ICD-10-CM

## 2025-05-09 DIAGNOSIS — Z79.4 TYPE 2 DIABETES MELLITUS WITH HYPERGLYCEMIA, WITH LONG-TERM CURRENT USE OF INSULIN: Primary | ICD-10-CM

## 2025-05-09 DIAGNOSIS — E78.5 HYPERLIPIDEMIA, UNSPECIFIED HYPERLIPIDEMIA TYPE: ICD-10-CM

## 2025-05-09 LAB
EXPIRATION DATE: ABNORMAL
EXPIRATION DATE: ABNORMAL
GLUCOSE BLDC GLUCOMTR-MCNC: 141 MG/DL (ref 70–130)
HBA1C MFR BLD: 8.2 % (ref 4.5–5.7)
Lab: ABNORMAL
Lab: ABNORMAL

## 2025-05-09 RX ORDER — INSULIN GLARGINE 100 [IU]/ML
INJECTION, SOLUTION SUBCUTANEOUS
Qty: 30 ML | Refills: 1 | Status: SHIPPED | OUTPATIENT
Start: 2025-05-09

## 2025-05-09 NOTE — PROGRESS NOTES
"Chief Complaint   Patient presents with    Diabetes        HPI   Andrzej Cheng is a 69 y.o. male had concerns including Diabetes.      Patient presents for follow-up of diabetes. He reports he has not eaten candy in the past 6 weeks.     Current diabetes medications include the following:  Ozempic 2 mg weekly  Lantus 32 units daily  Jardiance 25 mg daily  Glipizide 10 mg extended release daily  Humalog 2 units for 50 greater than 150 before meals     Patient is taking atorvastatin 80 mg daily for hyperlipidemia.    The following portions of the patient's history were reviewed and updated as appropriate: allergies, current medications, and past social history.    Review of Systems   Constitutional:  Negative for activity change.        /70   Pulse 75   Ht 167.6 cm (65.98\")   Wt 98.9 kg (218 lb)   BMI 35.20 kg/m²      Physical Exam      Constitutional:  well developed; well nourished  no acute distress  appears stated age  obese - Body mass index is 35.2 kg/m².   ENT/Thyroid: not examined   Eyes: Conjunctiva: clear   Respiratory:  breathing is unlabored  clear to auscultation bilaterally   Cardiovascular:  regular rate and rhythm   Chest:  Not performed.   Abdomen: Not performed.   : Not performed.   Musculoskeletal: Not performed   Skin: not performed.   Neuro: mental status, speech normal   Psych: mood and affect are within normal limits       Labs/Imaging  A1C:  Lab Results   Component Value Date    HGBA1C 8.2 (A) 05/09/2025    HGBA1C 9.7 (A) 04/02/2025      Glucose:  Lab Results   Component Value Date    POCGLU 141 (A) 05/09/2025      CMP:  Lab Results   Component Value Date    GLUCOSE 178 (H) 11/13/2024    BUN 15 11/13/2024    CREATININE 0.76 11/13/2024     11/13/2024    K 4.1 11/13/2024     11/13/2024    CALCIUM 9.7 11/13/2024    PROTEINTOT 7.0 11/13/2024    ALBUMIN 4.4 11/13/2024    ALT 23 11/13/2024    AST 16 11/13/2024    ALKPHOS 61 11/13/2024    BILITOT 0.5 11/13/2024    GLOB " 2.6 11/13/2024    AGRATIO 1.7 11/13/2024    BCR 19.7 11/13/2024    ANIONGAP 15.4 (H) 11/13/2024    EGFR 96.1 11/13/2024      Lipid Panel:  Lab Results   Component Value Date    CHOL 209 (H) 11/13/2024    TRIG 218 (H) 11/13/2024    HDL 42 11/13/2024     (H) 11/13/2024      Urine Microalbumin:  Lab Results   Component Value Date    MICROALBUR 2.3 11/13/2024      Freestyle nico was downloaded for review for dates ranging April 26 through May 9, 2025, CGM is active 95% of the time with average glucose 185, GMI 7.7%.  52% of data is within target range, 40% high, 8% very high.  Patient has baseline hyperglycemia which worsens postprandially.    Diagnoses and all orders for this visit:    1. Type 2 diabetes mellitus with hyperglycemia, with long-term current use of insulin (Primary)  -     POC Glucose, Blood  -     POC Glycosylated Hemoglobin (Hb A1C)  Diabetes is uncontrolled with hemoglobin A1c 8.2%, this is improved from prior and likely not fully representative of dietary changes made in the interim since last visit.  Glucose 141 during office visit  Goals for glycemic control were reviewed.  Patient remains at elevated risk for complications due to inadequate glycemic control.  CGM containing 72 hours of glucose data was downloaded and reviewed  Patient has mild baseline hyperglycemia which worsens postprandially.  We discussed options for changes to medical therapy.  Following discussion, patient elected to increase Lantus to 36 units daily  Patient will continue Ozempic 2 mg weekly  Patient will continue Jardiance 25 mg daily  Patient will continue glipizide 10 mg extended release daily  Patient will continue Humalog 2 units per 50 greater than 150 prior to meals.  He was also instructed to take 5 units with any meal containing a high amount of carbohydrates.  Screening labs are up-to-date from November 2024    2. Hyperlipidemia, unspecified hyperlipidemia type  Lipid panel is up-to-date, patient will  continue atorvastatin.       Return in about 3 months (around 8/9/2025) for Next scheduled follow up. The patient was instructed to contact the clinic with any interval questions or concerns.    Electronically signed by: Violeta Rice MD   Endocrinologist    Dictated Utilizing Dragon Dictation

## 2025-06-06 RX ORDER — HYDROCHLOROTHIAZIDE 12.5 MG/1
CAPSULE ORAL
Qty: 2 EACH | Refills: 6 | Status: SHIPPED | OUTPATIENT
Start: 2025-06-06

## 2025-06-06 NOTE — TELEPHONE ENCOUNTER
Rx Refill Note  Requested Prescriptions     Pending Prescriptions Disp Refills    Continuous Glucose Sensor (FreeStyle Eb 3 Plus Sensor) [Pharmacy Med Name: FREESTYLE EB 3+ SEN 15D KIT] 2 each 0     Sig: CHANGE SENSOR EVERY 15 DAYS      Last office visit with prescribing clinician: 5/9/2025     Next office visit with prescribing clinician: 8/28/2025     Sivan Mackenzie MA  06/06/25, 13:34 EDT

## 2025-07-03 RX ORDER — PEN NEEDLE, DIABETIC 32GX 5/32"
NEEDLE, DISPOSABLE MISCELLANEOUS
Qty: 200 EACH | Refills: 0 | Status: SHIPPED | OUTPATIENT
Start: 2025-07-03

## 2025-07-24 RX ORDER — INSULIN LISPRO 100 [IU]/ML
INJECTION, SOLUTION INTRAVENOUS; SUBCUTANEOUS
Qty: 15 ML | Refills: 0 | Status: SHIPPED | OUTPATIENT
Start: 2025-07-24

## 2025-07-24 NOTE — TELEPHONE ENCOUNTER
Rx Refill Note  Requested Prescriptions     Pending Prescriptions Disp Refills    Insulin Lispro, 1 Unit Dial, (HUMALOG) 100 UNIT/ML solution pen-injector [Pharmacy Med Name: Insulin Lispro (1 Unit Dial) 100 UNIT/ML Subcutaneous Solution Pen-injector] 15 mL 0     Sig: FOR USE PER CORRECTIONAL SCALE, USE NO MORE THAN 50 UNITS PER DAY      Last office visit with prescribing clinician: 5/9/2025     Next office visit with prescribing clinician: 8/28/2025     Kinsey Ye MA  07/24/25, 08:41 EDT

## 2025-08-07 ENCOUNTER — EXTERNAL PBMM DATA (OUTPATIENT)
Dept: PHARMACY | Facility: OTHER | Age: 70
End: 2025-08-07
Payer: MEDICARE

## 2025-08-18 RX ORDER — INSULIN LISPRO 100 [IU]/ML
INJECTION, SOLUTION INTRAVENOUS; SUBCUTANEOUS
Qty: 15 ML | Refills: 0 | Status: SHIPPED | OUTPATIENT
Start: 2025-08-18

## 2025-08-28 ENCOUNTER — OFFICE VISIT (OUTPATIENT)
Dept: ENDOCRINOLOGY | Facility: CLINIC | Age: 70
End: 2025-08-28
Payer: MEDICARE

## 2025-08-28 VITALS
HEIGHT: 66 IN | SYSTOLIC BLOOD PRESSURE: 132 MMHG | WEIGHT: 229 LBS | DIASTOLIC BLOOD PRESSURE: 74 MMHG | BODY MASS INDEX: 36.8 KG/M2 | HEART RATE: 76 BPM

## 2025-08-28 DIAGNOSIS — Z79.4 TYPE 2 DIABETES MELLITUS WITH HYPERGLYCEMIA, WITH LONG-TERM CURRENT USE OF INSULIN: Primary | ICD-10-CM

## 2025-08-28 DIAGNOSIS — E78.5 HYPERLIPIDEMIA, UNSPECIFIED HYPERLIPIDEMIA TYPE: ICD-10-CM

## 2025-08-28 DIAGNOSIS — E11.65 TYPE 2 DIABETES MELLITUS WITH HYPERGLYCEMIA, WITH LONG-TERM CURRENT USE OF INSULIN: Primary | ICD-10-CM

## 2025-08-28 LAB
EXPIRATION DATE: ABNORMAL
EXPIRATION DATE: ABNORMAL
GLUCOSE BLDC GLUCOMTR-MCNC: 218 MG/DL (ref 70–130)
HBA1C MFR BLD: 8.2 % (ref 4.5–5.7)
Lab: ABNORMAL
Lab: ABNORMAL

## 2025-08-28 RX ORDER — TIRZEPATIDE 10 MG/.5ML
10 INJECTION, SOLUTION SUBCUTANEOUS WEEKLY
Qty: 6 ML | Refills: 1 | Status: SHIPPED | OUTPATIENT
Start: 2025-08-28 | End: 2025-08-28 | Stop reason: SDUPTHER

## 2025-08-28 RX ORDER — INSULIN LISPRO 100 [IU]/ML
INJECTION, SOLUTION INTRAVENOUS; SUBCUTANEOUS
Qty: 45 ML | Refills: 1 | Status: SHIPPED | OUTPATIENT
Start: 2025-08-28

## 2025-08-28 RX ORDER — INSULIN GLARGINE 100 [IU]/ML
INJECTION, SOLUTION SUBCUTANEOUS
Qty: 45 ML | Refills: 1 | Status: SHIPPED | OUTPATIENT
Start: 2025-08-28

## 2025-08-28 RX ORDER — GLIPIZIDE 10 MG/1
10 TABLET, FILM COATED, EXTENDED RELEASE ORAL DAILY
Qty: 90 TABLET | Refills: 1 | Status: SHIPPED | OUTPATIENT
Start: 2025-08-28

## 2025-08-28 RX ORDER — TIRZEPATIDE 10 MG/.5ML
10 INJECTION, SOLUTION SUBCUTANEOUS WEEKLY
Qty: 6 ML | Refills: 1 | Status: SHIPPED | OUTPATIENT
Start: 2025-08-28